# Patient Record
Sex: FEMALE | Race: WHITE | NOT HISPANIC OR LATINO | URBAN - NONMETROPOLITAN AREA
[De-identification: names, ages, dates, MRNs, and addresses within clinical notes are randomized per-mention and may not be internally consistent; named-entity substitution may affect disease eponyms.]

---

## 2017-06-27 PROBLEM — H25.813: Noted: 2017-06-27

## 2017-06-27 PROBLEM — H52.4: Noted: 2020-01-05

## 2017-06-27 PROBLEM — H52.13: Noted: 2020-01-05

## 2017-06-27 PROBLEM — H40.1131: Noted: 2018-01-02

## 2017-06-27 PROBLEM — H52.223: Noted: 2020-01-05

## 2019-04-11 ENCOUNTER — IMPORTED ENCOUNTER (OUTPATIENT)
Dept: URBAN - NONMETROPOLITAN AREA CLINIC 1 | Facility: CLINIC | Age: 64
End: 2019-04-11

## 2019-04-11 PROCEDURE — 99213 OFFICE O/P EST LOW 20 MIN: CPT

## 2019-04-11 PROCEDURE — 76514 ECHO EXAM OF EYE THICKNESS: CPT

## 2019-04-11 NOTE — PATIENT DISCUSSION
Ocular Hypertension OU-  discussed findings w/patient-  IOPs today were 27 28 -  highest IOPs we have measured-  patient would like to go back on Latanoprost at this time-  start Latanoprost QHS OU new Rx sent today-  Pach done today.   OD: 570 OS: 566-  RTC 6-8 weeks for IOP check; 's Notes: OCT ON 7/10/18VF 7/10/18[place] Open 11/ 2018

## 2019-06-05 ENCOUNTER — IMPORTED ENCOUNTER (OUTPATIENT)
Dept: URBAN - NONMETROPOLITAN AREA CLINIC 1 | Facility: CLINIC | Age: 64
End: 2019-06-05

## 2019-06-05 PROCEDURE — 99213 OFFICE O/P EST LOW 20 MIN: CPT

## 2019-06-05 NOTE — PATIENT DISCUSSION
Ocular Hypertension OU-  discussed findings w/patient-  IOPs today were much better at 21 22-  continue Latanoprost QHS OU-  Pach done at last visit.   OD: 570 OS: 566-  RTC 3 mo f/u POAG w/OCT ONH; 's Notes: MRDFEOCT ON 7/10/18VF 7/10/18[place] Open 11/ 2018

## 2019-09-18 ENCOUNTER — IMPORTED ENCOUNTER (OUTPATIENT)
Dept: URBAN - NONMETROPOLITAN AREA CLINIC 1 | Facility: CLINIC | Age: 64
End: 2019-09-18

## 2019-09-18 PROCEDURE — 99213 OFFICE O/P EST LOW 20 MIN: CPT

## 2019-09-18 PROCEDURE — 92133 CPTRZD OPH DX IMG PST SGM ON: CPT

## 2019-09-18 NOTE — PATIENT DISCUSSION
Ocular Hypertension OU-  discussed findings w/patient-  IOPs today were better at 19 20-  C/D 0.5/0.6 OD and 0.5/0.06 OS-  continue Latanoprost QHS OU-  OCT ONH OD&OS: normal RNFL -  Pach done at last visit.   OD: 570 OS: 566-  RTC 3 mo f/u POAG w/OCT ONH; 's Notes: MRDFEOCT ON 9/18/2019VF 7/10/18G[facility] Open 11/ 2018

## 2019-12-17 ENCOUNTER — IMPORTED ENCOUNTER (OUTPATIENT)
Dept: URBAN - NONMETROPOLITAN AREA CLINIC 1 | Facility: CLINIC | Age: 64
End: 2019-12-17

## 2019-12-17 PROCEDURE — 92083 EXTENDED VISUAL FIELD XM: CPT

## 2019-12-17 NOTE — PATIENT DISCUSSION
Testing Only 24-2 VFOD: UR central scotoma non-specific defectsOS: UR mild central scotoma non-specific defects; 's Notes: MRDFEOCT ON 9/18/201924-2 VF 12/17/2019[place]io Open 11/ 2018

## 2019-12-18 ENCOUNTER — IMPORTED ENCOUNTER (OUTPATIENT)
Dept: URBAN - NONMETROPOLITAN AREA CLINIC 1 | Facility: CLINIC | Age: 64
End: 2019-12-18

## 2019-12-18 PROCEDURE — 99213 OFFICE O/P EST LOW 20 MIN: CPT

## 2019-12-18 NOTE — PATIENT DISCUSSION
Ocular Hypertension OU-  discussed findings w/patient-  24-2 VF done on 12/17/2019   OD: UR mild central scotoma non-specific defects no glaucomatous defects   OS: R mild central scotoma non-specific defects no glaucomatous defects-  OCT ONH done on 9/18/2019 OD&OS: normal RNFL -  Pach done on 4/11/2019  OD: 570 OS: 566-  IOPs were stable at 20 19-  C/D 0.5/0.6 OD and 0.5/0.06 OS-  continue Latanoprost QHS OU-  RTC 3 mo POAG w/GonioCataracts OU -  discussed findings w/patient-  no treatment indicated at this time-  UV protection recommended-  monitor yearly or prn Compound Myopic Astigmatism OU w/Presbyopia-  discussed findings w/patient-  new spectacle Rx issued-  monitor yearly or prn; 's Notes: MR 12/18/2019DFE 12/18/201924-2 VF 12/17/2019OCT ON 9/18/2019VF 7/10/18Gonio Open 11/ 2018

## 2020-03-18 ENCOUNTER — IMPORTED ENCOUNTER (OUTPATIENT)
Dept: URBAN - NONMETROPOLITAN AREA CLINIC 1 | Facility: CLINIC | Age: 65
End: 2020-03-18

## 2020-03-18 PROCEDURE — 92020 GONIOSCOPY: CPT

## 2020-03-18 PROCEDURE — 99213 OFFICE O/P EST LOW 20 MIN: CPT

## 2020-03-18 NOTE — PATIENT DISCUSSION
Ocular Hypertension OU-  discussed findings w/patient-  IOPs were stable at 19 21-  24-2 VF done on 12/17/2019   OD: UR mild central scotoma non-specific defects no glaucomatous defects   OS: R mild central scotoma non-specific defects no glaucomatous defects-  OCT ONH done on 9/18/2019 OD&OS: normal RNFL -  Pach done on 4/11/2019  OD: 570 OS: 566-  Gonio done today: Grade 4 w/Moderate Pigment-  C/D 0.5/0.6 OD and 0.5/0.06 OS-  continue Latanoprost QHS OU-  RTC 3 mo POAG w/OCT ONH Cataracts OU -  discussed findings w/patient-  no treatment indicated at this time-  UV protection recommended-  monitor yearly or prn; 's Notes: MR 12/18/2019DFE 12/18/201924-2 VF 12/17/2019OCT ON 9/18/2019VF 7/10/18[place] Open 11/ 2018

## 2020-07-15 ENCOUNTER — IMPORTED ENCOUNTER (OUTPATIENT)
Dept: URBAN - NONMETROPOLITAN AREA CLINIC 1 | Facility: CLINIC | Age: 65
End: 2020-07-15

## 2020-07-15 PROCEDURE — 92133 CPTRZD OPH DX IMG PST SGM ON: CPT

## 2020-07-15 PROCEDURE — 99213 OFFICE O/P EST LOW 20 MIN: CPT

## 2020-07-15 NOTE — PATIENT DISCUSSION
Ocular Hypertension OU-  discussed findings w/patient-  IOPs were stable at 21 and 22 but pt admits she doesn't use her drops like she should. Stressed importance of proper use and risks if not used properly.  -  24-2 VF done on 12/17/2019   OD: UR mild central scotoma non-specific defects no glaucomatous defects   OS: R mild central scotoma non-specific defects no glaucomatous defects-  OCT ONH done today 7/15/20 OD&OS: normal RNFL /stable-  Pach done on 4/11/2019  OD: 570 OS: 566-  Gonio done previously: Grade IV w/Moderate Pigment-  C/D 0.5/0.6 OD and 0.5/0.06 OS-  continue Latanoprost QHS OU-  RTC 6 months f/u VF 24-2Cataracts OU -  discussed findings w/patient-  no treatment indicated at this time-  UV protection recommended-  monitor yearly or prn; 's Notes: MR 12/18/2019DFE 12/18/201924-2 VF 12/17/2019OCT ON 7/15/20VF 7/10/18[place] Open 11/ 2018

## 2020-12-17 ENCOUNTER — IMPORTED ENCOUNTER (OUTPATIENT)
Dept: URBAN - NONMETROPOLITAN AREA CLINIC 1 | Facility: CLINIC | Age: 65
End: 2020-12-17

## 2020-12-17 PROCEDURE — 92015 DETERMINE REFRACTIVE STATE: CPT

## 2020-12-17 PROCEDURE — 99213 OFFICE O/P EST LOW 20 MIN: CPT

## 2020-12-17 PROCEDURE — 92083 EXTENDED VISUAL FIELD XM: CPT

## 2020-12-17 NOTE — PATIENT DISCUSSION
Ocular Hypertension OU-  discussed findings w/patient-  IOPs were stable at 21 and 22 but pt admits she doesn't use her drops like she should. Stressed importance of proper use and risks if not used properly.  -  24-2 VF done on 12/17/2020   OD: UR mild central scotoma non-specific defects no glaucomatous defects   OS: R mild central scotoma non-specific defects no glaucomatous defects-  OCT ONH done today 7/15/20 OD&OS: normal RNFL /stable-  Pach done on 4/11/2019  OD: 570 OS: 566-  Gonio done previously: Grade IV w/Moderate Pigment-  C/D 0.5/0.6 OD and 0.5/0.06 OS-  continue Latanoprost QHS OU-  RTC 6 months f/u VF 24-2Cataracts OU -  discussed findings w/patient-  no treatment indicated at this time-  UV protection recommended-  monitor yearly or prn; 's Notes: MR 12/18/2019DFE 12/18/201924-2 VF 12/17/2020OCT ON 7/15/20[facility] Open 11/ 2018

## 2021-03-18 ENCOUNTER — IMPORTED ENCOUNTER (OUTPATIENT)
Dept: URBAN - NONMETROPOLITAN AREA CLINIC 1 | Facility: CLINIC | Age: 66
End: 2021-03-18

## 2021-03-18 PROCEDURE — 92012 INTRM OPH EXAM EST PATIENT: CPT

## 2021-03-18 NOTE — PATIENT DISCUSSION
Ocular Hypertension OU-  discussed findings w/patient-  IOPs were stable and much improved from prior visist they were at 1820-  24-2 VF done on 12/17/2020   OD: UR mild central scotoma non-specific defects no glaucomatous defects   OS: R mild central scotoma non-specific defects no glaucomatous defects-  OCT ONH done today 7/15/20 OD&OS: normal RNFL /stable-  Pach done on 4/11/2019  OD: 570 OS: 566-  Gonio done previously: Grade IV w/Moderate Pigment-  C/D 0.5/0.6 OD and 0.5/0.06 OS-  continue Latanoprost QHS OU-  RTC 6 months f/u VF 24-2Cataracts OU -  discussed findings w/patient-  no treatment indicated at this time-  UV protection recommended-  monitor yearly or prn; 's Notes: MR 12/18/2019DFE 12/18/201924-2 VF 12/17/2020OCT ON 7/15/20[place] Boo 11/ 2018

## 2021-04-22 ENCOUNTER — IMPORTED ENCOUNTER (OUTPATIENT)
Dept: URBAN - NONMETROPOLITAN AREA CLINIC 1 | Facility: CLINIC | Age: 66
End: 2021-04-22

## 2021-04-22 PROCEDURE — 99213 OFFICE O/P EST LOW 20 MIN: CPT

## 2021-04-22 NOTE — PATIENT DISCUSSION
Ocular Hypertension OU-  discussed findings w/patient-  IOPs were stable and much improved from prior visist they were at 1820-  24-2 VF done on 4/22/2021   OD: UR mild central scotoma non-specific defects no glaucomatous defects   OS: R mild central scotoma non-specific defects no glaucomatous defects-  OCT ONH done today 7/15/2020 OD&OS: normal RNFL /stable-  Pach done on 4/11/2019  OD: 570 OS: 566-  Gonio done previously: Grade IV w/Moderate Pigment-  C/D 0.5/0.6 OD and 0.5/0.06 OS-  continue Latanoprost QHS OU-  RTC 4 months f/u OCT ONHCataracts OU -  discussed findings w/patient-  no treatment indicated at this time-  UV protection recommended-  monitor yearly or prn; 's Notes: MR 12/18/2019DFE 12/18/201924-2 VF 4/22/2021OCT ON 7/15/20[place] Open 11/ 2018

## 2021-08-23 ENCOUNTER — PREPPED CHART (OUTPATIENT)
Dept: URBAN - NONMETROPOLITAN AREA CLINIC 4 | Facility: CLINIC | Age: 66
End: 2021-08-23

## 2021-08-23 ENCOUNTER — IMPORTED ENCOUNTER (OUTPATIENT)
Dept: URBAN - NONMETROPOLITAN AREA CLINIC 1 | Facility: CLINIC | Age: 66
End: 2021-08-23

## 2021-08-23 PROCEDURE — 92133 CPTRZD OPH DX IMG PST SGM ON: CPT

## 2021-08-23 PROCEDURE — 99213 OFFICE O/P EST LOW 20 MIN: CPT

## 2021-08-23 NOTE — PATIENT DISCUSSION
Patient to continue with current gtt regimen. Patient advised to be compliant with gtts. Condition was discussed with patient and patient understands. Will continue to monitor patient for any progression in condition. Patient was advised to call us with any problems, questions, or concerns.

## 2021-08-23 NOTE — PATIENT DISCUSSION
Ocular Hypertension OU-  discussed findings w/patient-  IOPs 18 19-  24-2 VF done on 4/22/2021   OD: UR mild central scotoma non-specific defects no glaucomatous defects   OS: R mild central scotoma non-specific defects no glaucomatous defects-  OCT ONH done 8/23/2021    OD: 100um 9/10 SS normal RNFL all quadrants stable    OS: 99um 8/10 SS normal RNFL all quadrants stable-  Pach done on 4/11/2019  OD: 570 OS: 566-  Gonio done previously: Grade IV w/Moderate Pigment-  C/D 0.5/0.6 OD and 0.5/0.06 OS-  continue Latanoprost QHS OU-  RTC 6 mo f/u w/24-2 VF (Complete)Cataracts OU -  discussed findings w/patient-  no treatment indicated at this time-  UV protection recommended-  monitor yearly or prn; 's Notes: MR 12/18/2019DFE 12/18/201924-2 VF 4/22/2021OCT ON 8/23/2021[place] Open 11/ 2018

## 2022-02-15 ASSESSMENT — TONOMETRY
OD_IOP_MMHG: 18
OS_IOP_MMHG: 19

## 2022-02-15 ASSESSMENT — VISUAL ACUITY
OD_CC: 20/20
OS_CC: 20/25+2
OU_CC: 20/20

## 2022-02-23 ENCOUNTER — FOLLOW UP (OUTPATIENT)
Dept: URBAN - NONMETROPOLITAN AREA CLINIC 4 | Facility: CLINIC | Age: 67
End: 2022-02-23

## 2022-02-23 DIAGNOSIS — H40.1131: ICD-10-CM

## 2022-02-23 PROCEDURE — 99213 OFFICE O/P EST LOW 20 MIN: CPT

## 2022-02-23 PROCEDURE — 92083 EXTENDED VISUAL FIELD XM: CPT

## 2022-02-23 ASSESSMENT — VISUAL ACUITY
OU_CC: 20/20
OS_CC: 20/25+3
OD_CC: 20/20

## 2022-02-23 ASSESSMENT — TONOMETRY
OS_IOP_MMHG: 19
OD_IOP_MMHG: 17

## 2022-04-09 ASSESSMENT — VISUAL ACUITY
OS_CC: 20/25-2
OU_SC: 20/20-2
OU_SC: 20/20
OS_SC: 20/20-1
OS_SC: 20/25-
OD_CC: 20/25-2
OS_CC: 20/25-2
OD_CC: 20/25-2
OD_SC: 20/20
OD_PH: 20/25
OU_SC: J3
OD_SC: 20/25
OS_CC: 20/25
OS_SC: 20/25+2
OD_CC: 20/25
OS_SC: 20/20
OD_SC: 20/20
OD_PH: 20/25
OS_CC: 20/30+1
OS_SC: 20/25+2
OD_SC: 20/30+1
OU_CC: 20/20
OU_CC: 20/25
OD_CC: 20/40
OD_CC: 20/40
OS_CC: 20/30-1
OU_CC: 20/25
OD_SC: 20/20-2
OU_CC: 20/20

## 2022-04-09 ASSESSMENT — TONOMETRY
OD_IOP_MMHG: 20
OS_IOP_MMHG: 22
OS_IOP_MMHG: 20
OD_IOP_MMHG: 18
OD_IOP_MMHG: 20
OD_IOP_MMHG: 27
OS_IOP_MMHG: 18
OD_IOP_MMHG: 18
OS_IOP_MMHG: 22
OD_IOP_MMHG: 21
OS_IOP_MMHG: 19
OD_IOP_MMHG: 20
OS_IOP_MMHG: 28
OS_IOP_MMHG: 19
OS_IOP_MMHG: 19
OD_IOP_MMHG: 19
OS_IOP_MMHG: 21
OS_IOP_MMHG: 22
OD_IOP_MMHG: 21
OD_IOP_MMHG: 18

## 2023-08-10 ENCOUNTER — FOLLOW UP (OUTPATIENT)
Dept: URBAN - NONMETROPOLITAN AREA CLINIC 4 | Facility: CLINIC | Age: 68
End: 2023-08-10

## 2023-08-10 DIAGNOSIS — H40.1131: ICD-10-CM

## 2023-08-10 DIAGNOSIS — H25.813: ICD-10-CM

## 2023-08-10 PROCEDURE — 92014 COMPRE OPH EXAM EST PT 1/>: CPT

## 2023-08-10 PROCEDURE — 92020 GONIOSCOPY: CPT

## 2023-08-10 ASSESSMENT — VISUAL ACUITY
OS_SC: 20/40
OD_SC: 20/30-2
OU_SC: 20/30

## 2023-08-10 ASSESSMENT — TONOMETRY
OD_IOP_MMHG: 13
OS_IOP_MMHG: 13

## 2024-12-27 ENCOUNTER — COMPREHENSIVE EXAM (OUTPATIENT)
Age: 69
End: 2024-12-27

## 2024-12-27 DIAGNOSIS — H52.4: ICD-10-CM

## 2024-12-27 DIAGNOSIS — H25.813: ICD-10-CM

## 2024-12-27 DIAGNOSIS — H52.02: ICD-10-CM

## 2024-12-27 DIAGNOSIS — H52.223: ICD-10-CM

## 2024-12-27 DIAGNOSIS — H40.1131: ICD-10-CM

## 2024-12-27 PROCEDURE — 92015 DETERMINE REFRACTIVE STATE: CPT

## 2024-12-27 PROCEDURE — 99214 OFFICE O/P EST MOD 30 MIN: CPT

## 2024-12-27 PROCEDURE — 92133 CPTRZD OPH DX IMG PST SGM ON: CPT

## 2025-06-25 ENCOUNTER — FOLLOW UP (OUTPATIENT)
Age: 70
End: 2025-06-25

## 2025-06-25 DIAGNOSIS — H25.813: ICD-10-CM

## 2025-06-25 DIAGNOSIS — H40.1131: ICD-10-CM

## 2025-06-25 PROCEDURE — 99213 OFFICE O/P EST LOW 20 MIN: CPT

## 2025-06-25 PROCEDURE — 92020 GONIOSCOPY: CPT

## 2025-06-25 PROCEDURE — 92083 EXTENDED VISUAL FIELD XM: CPT
